# Patient Record
Sex: FEMALE | Race: WHITE | Employment: UNEMPLOYED | URBAN - METROPOLITAN AREA
[De-identification: names, ages, dates, MRNs, and addresses within clinical notes are randomized per-mention and may not be internally consistent; named-entity substitution may affect disease eponyms.]

---

## 2018-03-05 ENCOUNTER — HOSPITAL ENCOUNTER (OUTPATIENT)
Dept: LAB | Age: 78
Discharge: HOME | End: 2018-03-05

## 2018-03-05 PROCEDURE — 88312 SPECIAL STAINS GROUP 1: CPT | Performed by: INTERNAL MEDICINE

## 2018-03-05 PROCEDURE — 88305 TISSUE EXAM BY PATHOLOGIST: CPT | Performed by: INTERNAL MEDICINE

## 2024-01-31 ENCOUNTER — OFFICE VISIT (OUTPATIENT)
Dept: ORTHOPEDIC SURGERY | Age: 84
End: 2024-01-31
Payer: COMMERCIAL

## 2024-01-31 VITALS — WEIGHT: 177 LBS

## 2024-01-31 DIAGNOSIS — M25.562 LEFT KNEE PAIN, UNSPECIFIED CHRONICITY: Primary | ICD-10-CM

## 2024-01-31 DIAGNOSIS — M17.12 PRIMARY OSTEOARTHRITIS OF LEFT KNEE: ICD-10-CM

## 2024-01-31 PROCEDURE — 1123F ACP DISCUSS/DSCN MKR DOCD: CPT | Performed by: PHYSICIAN ASSISTANT

## 2024-01-31 PROCEDURE — 99203 OFFICE O/P NEW LOW 30 MIN: CPT | Performed by: PHYSICIAN ASSISTANT

## 2024-01-31 NOTE — PROGRESS NOTES
Name: Peg Almonte  YOB: 1940  Gender: female  MRN: [id number]    CC:   Chief Complaint   Patient presents with    Knee Pain     Left knee         HPI: Peg Almonte is a 83 y.o. female with a PMHx of HTN, HLD, gout, and s/p right TKA in 2006  here for evaluation of left knee pain.  The pain has been present for 6-7 months and is becoming worse. The pain is primarily on the Medial side.   she describes the pain as a constant ache that is intermittently sharp with activity, grinding, and and often feels unstable  The pain is worse with going up and/or down stairs, rising after sitting, standing, and walking  The pain does not radiate down the leg.  she denies numbness and tingling down the leg.   Treatment so far has been activity modification, Tylenol, and oral steroids with little relief.      Allergies   Allergen Reactions    Methotrexate Other (See Comments)     Patient reports oral ulcers     Patient reports oral ulcers         Review of Systems:  As per HPI.  Pertinent positives and negatives are addressed with the patient, particularly those related to musculoskeletal concerns.   Non-orthopaedic concerns were referred back to the primary care physician.    PHYSICAL EXAMINATION:   The patient appears their stated age and they are in no distress.  Wt 80.3 kg (177 lb)       The lower extremities are as described below.  Circulation is normal with palpable pedal pulses bilaterally and no edema.  There is no lymph adenopathy in the popliteal or malleolar region.  The skin is without stasis disease distally bilaterally.  Sensation is intact to light touch bilaterally.  There is mild tenderness to palpation over the medial joint line of the left knee(s)  The gait is noted to be moderate antalgic and and ambulates with a Cane  Range of motion is 0-120 degrees on the right and 0-100 degrees on the left.  Positive patella grind on the left  left knee: There is 2mm of anterior/posterior

## 2024-03-08 ENCOUNTER — HOSPITAL ENCOUNTER (EMERGENCY)
Age: 84
Discharge: HOME | End: 2024-03-08
Payer: COMMERCIAL

## 2024-03-08 VITALS
RESPIRATION RATE: 18 BRPM | TEMPERATURE: 98.1 F | HEIGHT: 62 IN | DIASTOLIC BLOOD PRESSURE: 76 MMHG | HEART RATE: 74 BPM | OXYGEN SATURATION: 96 % | SYSTOLIC BLOOD PRESSURE: 132 MMHG | WEIGHT: 190 LBS | BODY MASS INDEX: 34.96 KG/M2

## 2024-03-08 DIAGNOSIS — K92.1 MELANOTIC STOOLS: Primary | ICD-10-CM

## 2024-03-08 LAB
ABO + RH BLD: NORMAL
ALBUMIN SERPL-MCNC: 3.5 G/DL (ref 3.2–4.6)
ALBUMIN/GLOB SERPL: 1.1 (ref 0.4–1.6)
ALP SERPL-CCNC: 77 U/L (ref 50–136)
ALT SERPL-CCNC: 22 U/L (ref 12–65)
ANION GAP SERPL CALC-SCNC: 4 MMOL/L (ref 2–11)
AST SERPL-CCNC: 24 U/L (ref 15–37)
BASOPHILS # BLD: 0.1 K/UL (ref 0–0.2)
BASOPHILS NFR BLD: 1 % (ref 0–2)
BILIRUB SERPL-MCNC: 0.5 MG/DL (ref 0.2–1.1)
BLOOD GROUP ANTIBODIES SERPL: NORMAL
BUN SERPL-MCNC: 58 MG/DL (ref 8–23)
CALCIUM SERPL-MCNC: 8.7 MG/DL (ref 8.3–10.4)
CHLORIDE SERPL-SCNC: 101 MMOL/L (ref 103–113)
CO2 SERPL-SCNC: 31 MMOL/L (ref 21–32)
CREAT SERPL-MCNC: 2.3 MG/DL (ref 0.6–1)
DIFFERENTIAL METHOD BLD: ABNORMAL
EKG DIAGNOSIS: NORMAL
EKG Q-T INTERVAL: 460 MS
EKG QRS DURATION: 88 MS
EKG QTC CALCULATION (BAZETT): 474 MS
EKG R AXIS: 56 DEGREES
EKG T AXIS: 64 DEGREES
EKG VENTRICULAR RATE: 64 BPM
EOSINOPHIL # BLD: 0 K/UL (ref 0–0.8)
EOSINOPHIL NFR BLD: 0 % (ref 0.5–7.8)
ERYTHROCYTE [DISTWIDTH] IN BLOOD BY AUTOMATED COUNT: 17.2 % (ref 11.9–14.6)
GLOBULIN SER CALC-MCNC: 3.3 G/DL (ref 2.8–4.5)
GLUCOSE SERPL-MCNC: 141 MG/DL (ref 65–100)
HCT VFR BLD AUTO: 38.6 % (ref 35.8–46.3)
HGB BLD-MCNC: 12.5 G/DL (ref 11.7–15.4)
IMM GRANULOCYTES # BLD AUTO: 0.1 K/UL (ref 0–0.5)
IMM GRANULOCYTES NFR BLD AUTO: 1 % (ref 0–5)
INR PPP: 0.9
LYMPHOCYTES # BLD: 0.7 K/UL (ref 0.5–4.6)
LYMPHOCYTES NFR BLD: 6 % (ref 13–44)
MCH RBC QN AUTO: 34.2 PG (ref 26.1–32.9)
MCHC RBC AUTO-ENTMCNC: 32.4 G/DL (ref 31.4–35)
MCV RBC AUTO: 105.5 FL (ref 82–102)
MONOCYTES # BLD: 0.4 K/UL (ref 0.1–1.3)
MONOCYTES NFR BLD: 4 % (ref 4–12)
NEUTS SEG # BLD: 9.7 K/UL (ref 1.7–8.2)
NEUTS SEG NFR BLD: 88 % (ref 43–78)
NRBC # BLD: 0 K/UL (ref 0–0.2)
PLATELET # BLD AUTO: 181 K/UL (ref 150–450)
PMV BLD AUTO: 9.5 FL (ref 9.4–12.3)
POTASSIUM SERPL-SCNC: 4.3 MMOL/L (ref 3.5–5.1)
PROT SERPL-MCNC: 6.8 G/DL (ref 6.3–8.2)
PROTHROMBIN TIME: 13 SEC (ref 11.3–14.9)
RBC # BLD AUTO: 3.66 M/UL (ref 4.05–5.2)
SODIUM SERPL-SCNC: 136 MMOL/L (ref 136–146)
SPECIMEN EXP DATE BLD: NORMAL
WBC # BLD AUTO: 10.9 K/UL (ref 4.3–11.1)

## 2024-03-08 PROCEDURE — C9113 INJ PANTOPRAZOLE SODIUM, VIA: HCPCS

## 2024-03-08 PROCEDURE — A4216 STERILE WATER/SALINE, 10 ML: HCPCS

## 2024-03-08 PROCEDURE — 94762 N-INVAS EAR/PLS OXIMTRY CONT: CPT

## 2024-03-08 PROCEDURE — 99284 EMERGENCY DEPT VISIT MOD MDM: CPT

## 2024-03-08 PROCEDURE — 93005 ELECTROCARDIOGRAM TRACING: CPT

## 2024-03-08 PROCEDURE — 80053 COMPREHEN METABOLIC PANEL: CPT

## 2024-03-08 PROCEDURE — 2580000003 HC RX 258

## 2024-03-08 PROCEDURE — 85025 COMPLETE CBC W/AUTO DIFF WBC: CPT

## 2024-03-08 PROCEDURE — 86901 BLOOD TYPING SEROLOGIC RH(D): CPT

## 2024-03-08 PROCEDURE — 85610 PROTHROMBIN TIME: CPT

## 2024-03-08 PROCEDURE — 96374 THER/PROPH/DIAG INJ IV PUSH: CPT

## 2024-03-08 PROCEDURE — 86900 BLOOD TYPING SEROLOGIC ABO: CPT

## 2024-03-08 PROCEDURE — 86850 RBC ANTIBODY SCREEN: CPT

## 2024-03-08 PROCEDURE — 6360000002 HC RX W HCPCS

## 2024-03-08 PROCEDURE — 93010 ELECTROCARDIOGRAM REPORT: CPT | Performed by: INTERNAL MEDICINE

## 2024-03-08 RX ADMIN — PANTOPRAZOLE SODIUM 40 MG: 40 INJECTION, POWDER, FOR SOLUTION INTRAVENOUS at 16:56

## 2024-03-08 ASSESSMENT — PAIN SCALES - GENERAL
PAINLEVEL_OUTOF10: 6
PAINLEVEL_OUTOF10: 0

## 2024-03-08 ASSESSMENT — LIFESTYLE VARIABLES
HOW MANY STANDARD DRINKS CONTAINING ALCOHOL DO YOU HAVE ON A TYPICAL DAY: PATIENT DOES NOT DRINK
HOW OFTEN DO YOU HAVE A DRINK CONTAINING ALCOHOL: NEVER

## 2024-03-08 ASSESSMENT — PAIN - FUNCTIONAL ASSESSMENT
PAIN_FUNCTIONAL_ASSESSMENT: 0-10
PAIN_FUNCTIONAL_ASSESSMENT: 0-10

## 2024-03-08 NOTE — DISCHARGE INSTRUCTIONS
Hemoglobin here is stable.  Recommend calling [facility] to schedule a follow-up.      Your kidney function was a bit worse than your usual baseline.  This may be due to dehydration.  Recommend increasing your fluid intake carefully and have this rechecked next week by your primary doctor.    Please return with any new or worsening symptoms in the interim.

## 2024-03-08 NOTE — ED PROVIDER NOTES
Emergency Department Provider Note       PCP: Braeden Haynes MD   Age: 83 y.o.   Sex: female     DISPOSITION Decision To Discharge 03/08/2024 06:46:26 PM       ICD-10-CM    1. Melanotic stools  K92.1           Medical Decision Making     83-year-old female presents with 2 to 3-week history of melanotic stools and fatigue.  Found to be Hemoccult positive by her PCP today and sent for further evaluation.  No prior history of GI bleeding.  On Plavix but no other forms of anticoagulation.  No other reported symptoms.  Patient admits to taking daily iron supplementation.  Patient arrives hemodynamically stable in no acute distress.  She has very mild tenderness over the suprapubic area.  Will plan to check H&H and load with Protonix.    Labs here demonstrate hemoglobin of 12.5 with hematocrit of 38.  This is essentially unchanged from her baseline.  Her metabolic panel demonstrates acute on chronic renal insufficiency with creatinine of 2.3 up from her baseline floating from about 1.5-1.8.  Her BUN and GFR are stable.  Patient's vital signs have continued to remain stable here.  Repeat rectal examination reveals small nonthrombosed external hemorrhoids with dark brown stool in the vault.  Her Hemoccult here is a weakly positive which could certainly be chronic in character. Patient noted to be in rate controlled atrial fibrillation which may account for her ongoing fatigue.  She has a well-documented history of this and is undergoing Watchman procedure. I discussed findings with patient and family present at bedside.  Offered observational admission but did not feel entirely necessary given her stable hemoglobin and vital signs. Through shared decision-making, patient opted to be discharged home with strict return precautions and follow-up with her GI physicians next week to determine need for endoscopy.  Daughter states that she recently increased her allopurinol dosage which may account for her declining  medications on file        Past Medical History:   Diagnosis Date    Arthritis     OA- generalized    Cancer (HCC)     skin cancer- removed from nose    Claustrophobia     DDD (degenerative disc disease), lumbar     GERD (gastroesophageal reflux disease)     tums prn - sleeps on 2 pillows    High cholesterol     Hypertension     controlled with meds    Kidney stone     Mononeuropathy     Osteoporosis     Psychiatric disorder     anxiety-     RLS (restless legs syndrome)     Stroke (HCC)     brain hemorrhage        Past Surgical History:   Procedure Laterality Date    CATARACT REMOVAL       SECTION      CHOLECYSTECTOMY  2009    HEENT      dental implants-  MAC    HEENT      eye-laser for blood behind eye x3    HERNIA REPAIR  2011    NEUROLOGICAL SURGERY  2009    brain hemorrhage- drained bleeding ?evacuation    ORTHOPEDIC SURGERY      pavithra on left toe     TOTAL KNEE ARTHROPLASTY  2006    R knee replacement    TUBAL LIGATION          Social History     Socioeconomic History    Marital status:    Tobacco Use    Smoking status: Never    Smokeless tobacco: Never   Substance and Sexual Activity    Alcohol use: No    Drug use: No        Previous Medications    AMLODIPINE (NORVASC) 5 MG TABLET    Take 5 mg by mouth    COENZYME Q10 100 MG CAPS CAPSULE    Take 100 mg by mouth daily    DARIFENACIN (ENABLEX) 15 MG EXTENDED RELEASE TABLET    Take 15 mg by mouth    FENOFIBRATE (TRICOR) 145 MG TABLET    Take 145 mg by mouth daily    LOSARTAN-HYDROCHLOROTHIAZIDE (HYZAAR) 100-25 MG PER TABLET    Take 1 tablet by mouth daily    ROPINIROLE (REQUIP) 1 MG TABLET    Take 2 mg by mouth 2 times daily    SERTRALINE (ZOLOFT) 50 MG TABLET    Take 50 mg by mouth    VITAMIN D 25 MCG (1000 UT) CAPS    Take 1 tablet by mouth daily        Results for orders placed or performed during the hospital encounter of 24   CBC with Auto Differential   Result Value Ref Range    WBC 10.9 4.3 - 11.1 K/uL    RBC 3.66 (L) 4.05 - 5.2 M/uL

## 2024-03-09 NOTE — ED NOTES
I have reviewed discharge instructions with the patient.  The patient verbalized understanding.    Patient left ED via Discharge Method: ambulatory to Home with family.     Opportunity for questions and clarification provided.       Patient given 0 scripts.         To continue your aftercare when you leave the hospital, you may receive an automated call from our care team to check in on how you are doing.  This is a free service and part of our promise to provide the best care and service to meet your aftercare needs.” If you have questions, or wish to unsubscribe from this service please call [phone].  Thank you for Choosing our [facility] Emergency Department.       Adriano Chino RN  03/08/24 [id number]

## 2024-03-13 DIAGNOSIS — M17.12 PRIMARY OSTEOARTHRITIS OF LEFT KNEE: Primary | ICD-10-CM

## 2024-04-19 DIAGNOSIS — M17.12 PRIMARY OSTEOARTHRITIS OF LEFT KNEE: Primary | ICD-10-CM

## 2024-04-25 ENCOUNTER — TELEPHONE (OUTPATIENT)
Dept: ORTHOPEDIC SURGERY | Age: 84
End: 2024-04-25

## 2024-04-25 NOTE — TELEPHONE ENCOUNTER
Daughter calling to say she will come off per Eloquist and aspirin 6/15 and cardiologist suggest she wait until after that for surgery. They need to talk with you regarding all the other things going on with her. Please call jerod Moe [phone]

## 2024-04-25 NOTE — TELEPHONE ENCOUNTER
Spoke with daughter olvin need to post pone the surgery due to afib doctor that she needs to wait after 06/15/24will move the surgery to 10/25/24

## 2024-05-28 ENCOUNTER — TELEPHONE (OUTPATIENT)
Dept: ORTHOPEDIC SURGERY | Age: 84
End: 2024-05-28

## 2024-05-28 NOTE — TELEPHONE ENCOUNTER
She is scheduled for a knee replacement in October and she went to see her PCP and they discussed it because her daughter doesn't feel she is ready for the recovery. They want to leave the appointment for surgery in October to see if she improves with her Afib but her doctor is wondering about some injections in the knee and maybe not do the surgery. [phone] is her Daughter's number and her name is Payal.

## 2024-05-28 NOTE — TELEPHONE ENCOUNTER
Spoke with patients daughter and explained that her mom was not a candidate for injections and to talk with cardiologist again and let me know if we keep her October date or cancel it

## 2024-09-27 ENCOUNTER — TELEPHONE (OUTPATIENT)
Dept: ORTHOPEDIC SURGERY | Age: 84
End: 2024-09-27

## 2025-04-28 ENCOUNTER — OFFICE VISIT (OUTPATIENT)
Dept: ORTHOPEDIC SURGERY | Age: 85
End: 2025-04-28
Payer: MEDICARE

## 2025-04-28 DIAGNOSIS — M25.562 LEFT KNEE PAIN, UNSPECIFIED CHRONICITY: Primary | ICD-10-CM

## 2025-04-28 DIAGNOSIS — M17.12 PRIMARY OSTEOARTHRITIS OF LEFT KNEE: ICD-10-CM

## 2025-04-28 DIAGNOSIS — N18.31 CKD STAGE 3A, GFR 45-59 ML/MIN (HCC): ICD-10-CM

## 2025-04-28 PROBLEM — L40.50 PSORIATIC ARTHRITIS (HCC): Status: ACTIVE | Noted: 2022-10-10

## 2025-04-28 PROBLEM — E27.40 ADRENAL INSUFFICIENCY: Status: ACTIVE | Noted: 2024-05-30

## 2025-04-28 PROCEDURE — G8421 BMI NOT CALCULATED: HCPCS | Performed by: PHYSICIAN ASSISTANT

## 2025-04-28 PROCEDURE — G2211 COMPLEX E/M VISIT ADD ON: HCPCS | Performed by: PHYSICIAN ASSISTANT

## 2025-04-28 PROCEDURE — 1159F MED LIST DOCD IN RCRD: CPT | Performed by: PHYSICIAN ASSISTANT

## 2025-04-28 PROCEDURE — 1123F ACP DISCUSS/DSCN MKR DOCD: CPT | Performed by: PHYSICIAN ASSISTANT

## 2025-04-28 PROCEDURE — 1160F RVW MEDS BY RX/DR IN RCRD: CPT | Performed by: PHYSICIAN ASSISTANT

## 2025-04-28 PROCEDURE — 1090F PRES/ABSN URINE INCON ASSESS: CPT | Performed by: PHYSICIAN ASSISTANT

## 2025-04-28 PROCEDURE — 1036F TOBACCO NON-USER: CPT | Performed by: PHYSICIAN ASSISTANT

## 2025-04-28 PROCEDURE — 99215 OFFICE O/P EST HI 40 MIN: CPT | Performed by: PHYSICIAN ASSISTANT

## 2025-04-28 PROCEDURE — G8427 DOCREV CUR MEDS BY ELIG CLIN: HCPCS | Performed by: PHYSICIAN ASSISTANT

## 2025-04-28 NOTE — PROGRESS NOTES
Name: Peg Almonte  YOB: 1940  Gender: female  MRN: [id number]    CC:   Chief Complaint   Patient presents with    Knee Pain     Left knee          HPI: Peg Almonte is a 84 y.o. female with a PMHx of HTN, HLD, gout, and s/p right TKA in 2006  here for evaluation of left knee pain.    01/31/24: The pain has been present for 6-7 months and is becoming worse. The pain is primarily on the Medial side.   she describes the pain as a constant ache that is intermittently sharp with activity, grinding, and and often feels unstable  The pain is worse with going up and/or down stairs, rising after sitting, standing, and walking  The pain does not radiate down the leg.  she denies numbness and tingling down the leg.   Treatment so far has been activity modification, Tylenol, and oral steroids with little relief.    04/28/25: Patient returns today for follow up evaluation of left knee pain.  She states she is having worsening left knee pain that is now interfering with her ADLs.  She does have several comorbidities including new adrenal efficiency, psoriatic arthritis, chronic gout, and chronic kidney disease.  She has had clearance from her cardiologist for surgery but she would like to consider proceeding with surgery again.  She is seeing an endocrinologist and nephrologist.  She also sees the wound center for difficult healing wounds, but states these typically improves after a few weeks and she has not had any wounds lasting beyond months.      Allergies   Allergen Reactions    Methotrexate Other (See Comments)     Patient reports oral ulcers     Patient reports oral ulcers         Review of Systems:  As per HPI.  Pertinent positives and negatives are addressed with the patient, particularly those related to musculoskeletal concerns.   Non-orthopaedic concerns were referred back to the primary care physician.    PHYSICAL EXAMINATION:   The patient appears their stated age and they are in no

## 2025-08-05 ENCOUNTER — TELEPHONE (OUTPATIENT)
Dept: ORTHOPEDIC SURGERY | Age: 85
End: 2025-08-05

## 2025-08-25 ENCOUNTER — TELEPHONE (OUTPATIENT)
Dept: ORTHOPEDIC SURGERY | Age: 85
End: 2025-08-25